# Patient Record
Sex: MALE | Race: BLACK OR AFRICAN AMERICAN | URBAN - METROPOLITAN AREA
[De-identification: names, ages, dates, MRNs, and addresses within clinical notes are randomized per-mention and may not be internally consistent; named-entity substitution may affect disease eponyms.]

---

## 2023-10-15 ENCOUNTER — HOSPITAL ENCOUNTER (OUTPATIENT)
Age: 28
Discharge: HOME | End: 2023-10-15
Payer: COMMERCIAL

## 2023-10-15 VITALS
HEART RATE: 71 BPM | SYSTOLIC BLOOD PRESSURE: 129 MMHG | TEMPERATURE: 97 F | DIASTOLIC BLOOD PRESSURE: 72 MMHG | RESPIRATION RATE: 16 BRPM | OXYGEN SATURATION: 100 %

## 2023-10-15 DIAGNOSIS — J01.41 ACUTE RECURRENT PANSINUSITIS: Primary | ICD-10-CM

## 2023-10-15 DIAGNOSIS — J02.9 SORE THROAT: ICD-10-CM

## 2023-10-15 RX ORDER — FLUTICASONE PROPIONATE 50 MCG
2 SPRAY, SUSPENSION (ML) NASAL DAILY
Qty: 18.2 G | Refills: 0 | Status: SHIPPED | OUTPATIENT
Start: 2023-10-15

## 2023-10-15 RX ORDER — MOXIFLOXACIN HYDROCHLORIDE 400 MG/1
400 TABLET ORAL DAILY
Qty: 10 TABLET | Refills: 0 | Status: SHIPPED | OUTPATIENT
Start: 2023-10-15 | End: 2023-10-25

## 2023-10-15 NOTE — ED INITIAL ASSESSMENT (HPI)
Sinus pain and sore throat.  Pt concerned for sinus infection and would like abx and refusing any testing at this time

## 2023-10-15 NOTE — DISCHARGE INSTRUCTIONS
ENT follow-up. Risk for resistance, or antibiotic failure moxifloxacin 400 mg once daily for 10 days was sent to the pharmacy. There may be increased cough for this medication, but you have already placed on other medications that electively resistant to your sinus infection. Call Dr. Jaspal Sylvester for follow-up. He has ENT. He should not be experiencing the sinus infections as much as you have been. Go to the emergency for any new or worsening symptoms  Flonase sent to the pharmacy. 2 sprays each nostril daily until finished. This medication be bought over-the-counter. Any refills for Flonase will be needed by primary care, or ENT. Over-the-counter Mucinex fast max all-in-one cold and flu may help out with the mucus. Antibiotics do not treat symptoms they treat bacterial infections.

## 2023-11-06 ENCOUNTER — HOSPITAL ENCOUNTER (EMERGENCY)
Facility: HOSPITAL | Age: 28
Discharge: HOME | End: 2023-11-06
Payer: COMMERCIAL

## 2023-11-06 VITALS
HEART RATE: 73 BPM | BODY MASS INDEX: 43.95 KG/M2 | OXYGEN SATURATION: 98 % | RESPIRATION RATE: 18 BRPM | WEIGHT: 307 LBS | DIASTOLIC BLOOD PRESSURE: 74 MMHG | SYSTOLIC BLOOD PRESSURE: 111 MMHG | TEMPERATURE: 98 F | HEIGHT: 70 IN

## 2023-11-06 DIAGNOSIS — T16.2XXA ACUTE FOREIGN BODY OF LEFT EAR CANAL, INITIAL ENCOUNTER: Primary | ICD-10-CM

## 2023-11-06 PROCEDURE — 99282 EMERGENCY DEPT VISIT SF MDM: CPT

## 2023-11-06 PROCEDURE — 99283 EMERGENCY DEPT VISIT LOW MDM: CPT

## (undated) NOTE — LETTER
Date & Time: 10/15/2023, 10:19 AM  Patient: Anahy Cyr  Encounter Provider(s):    SHAN Han       To Whom It May Concern:    Sarah Navarro was seen and treated in our department on 10/15/2023. He can return to work when he remains fever free for minimum of 24 hours without using fever reducing medications.     SHAN Land, 10/15/23, 10:19 AM